# Patient Record
Sex: FEMALE | Race: BLACK OR AFRICAN AMERICAN | NOT HISPANIC OR LATINO | URBAN - NONMETROPOLITAN AREA
[De-identification: names, ages, dates, MRNs, and addresses within clinical notes are randomized per-mention and may not be internally consistent; named-entity substitution may affect disease eponyms.]

---

## 2024-02-27 ENCOUNTER — OFFICE (OUTPATIENT)
Dept: URBAN - NONMETROPOLITAN AREA CLINIC 1 | Facility: CLINIC | Age: 44
End: 2024-02-27

## 2024-02-27 VITALS
DIASTOLIC BLOOD PRESSURE: 72 MMHG | WEIGHT: 273 LBS | SYSTOLIC BLOOD PRESSURE: 101 MMHG | HEIGHT: 69 IN | HEART RATE: 81 BPM

## 2024-02-27 DIAGNOSIS — K21.9 GASTRO-ESOPHAGEAL REFLUX DISEASE WITHOUT ESOPHAGITIS: ICD-10-CM

## 2024-02-27 DIAGNOSIS — K31.84 GASTROPARESIS: ICD-10-CM

## 2024-02-27 DIAGNOSIS — Z79.02 LONG TERM (CURRENT) USE OF ANTITHROMBOTICS/ANTIPLATELETS: ICD-10-CM

## 2024-02-27 PROCEDURE — 99204 OFFICE O/P NEW MOD 45 MIN: CPT | Performed by: NURSE PRACTITIONER

## 2024-02-27 NOTE — SERVICEHPINOTES
Chantal is a pleasant 44-year-old female with a history of CHF, severe cardiomyopathy (EF&lt25%), hyperlipidemia, bipolar disorder, ?schizophrenia, CKD, GERD, obesity, and asthma.  She takes Plavix because of previous ventricular thrombus following MI. Refused stents.   She was diagnosed with gastroparesis last fall.  She is doing well on Reglan 10 mg b.i.d, managed by Dr. Youssef. She will be due for her initial colonoscopy, for colon cancer screening, next February.    Her maternal grandmother and paternal grandmother had colon cancer.  She has normal bowel movements.  No abdominal pain or blood with her stools.  She takes pantoprazole for GERD and rarely has breakthrough heartburn or acid reflux.
br
br   Gastric emptying scan 9/6/23-
br FINDINGS:br15 minutes following a meal containing technetium 99 M sulfur brcolloid, 100% of activity is noted in the stomach.brAt 1 hour, 82 % of activity remains.brAt 2 hours, 80 % of activity remains.brAt 3 hours, 77 % of activity remains.brAt 4 hours, 81 % of activity remains.brIMPRESSION:brFindings consistent with markedly delayed gastric emptying.

## 2024-02-27 NOTE — SERVICENOTES
I will see her in a year for  colonoscopy consult.
She will continue Reglan as previously prescribed with Dr. Youssef.
 she will continue pantoprazole 40 mg, as previously prescribed with Dr. Youssef.